# Patient Record
Sex: FEMALE | Race: OTHER | URBAN - METROPOLITAN AREA
[De-identification: names, ages, dates, MRNs, and addresses within clinical notes are randomized per-mention and may not be internally consistent; named-entity substitution may affect disease eponyms.]

---

## 2020-06-24 ENCOUNTER — EMERGENCY (EMERGENCY)
Facility: HOSPITAL | Age: 45
LOS: 0 days | Discharge: HOME | End: 2020-06-25
Attending: EMERGENCY MEDICINE
Payer: MEDICAID

## 2020-06-24 VITALS
TEMPERATURE: 99 F | SYSTOLIC BLOOD PRESSURE: 146 MMHG | OXYGEN SATURATION: 99 % | HEART RATE: 82 BPM | RESPIRATION RATE: 18 BRPM | DIASTOLIC BLOOD PRESSURE: 76 MMHG

## 2020-06-24 DIAGNOSIS — Z20.828 CONTACT WITH AND (SUSPECTED) EXPOSURE TO OTHER VIRAL COMMUNICABLE DISEASES: ICD-10-CM

## 2020-06-24 DIAGNOSIS — R07.89 OTHER CHEST PAIN: ICD-10-CM

## 2020-06-24 DIAGNOSIS — M54.6 PAIN IN THORACIC SPINE: ICD-10-CM

## 2020-06-24 DIAGNOSIS — E03.9 HYPOTHYROIDISM, UNSPECIFIED: ICD-10-CM

## 2020-06-24 DIAGNOSIS — M79.602 PAIN IN LEFT ARM: ICD-10-CM

## 2020-06-24 DIAGNOSIS — R06.02 SHORTNESS OF BREATH: ICD-10-CM

## 2020-06-24 LAB
ALBUMIN SERPL ELPH-MCNC: 4.7 G/DL — SIGNIFICANT CHANGE UP (ref 3.5–5.2)
ALP SERPL-CCNC: 69 U/L — SIGNIFICANT CHANGE UP (ref 30–115)
ALT FLD-CCNC: 23 U/L — SIGNIFICANT CHANGE UP (ref 0–41)
ANION GAP SERPL CALC-SCNC: 14 MMOL/L — SIGNIFICANT CHANGE UP (ref 7–14)
AST SERPL-CCNC: 27 U/L — SIGNIFICANT CHANGE UP (ref 0–41)
BASOPHILS # BLD AUTO: 0.04 K/UL — SIGNIFICANT CHANGE UP (ref 0–0.2)
BASOPHILS NFR BLD AUTO: 0.5 % — SIGNIFICANT CHANGE UP (ref 0–1)
BILIRUB SERPL-MCNC: 0.4 MG/DL — SIGNIFICANT CHANGE UP (ref 0.2–1.2)
BUN SERPL-MCNC: 11 MG/DL — SIGNIFICANT CHANGE UP (ref 10–20)
CALCIUM SERPL-MCNC: 9.8 MG/DL — SIGNIFICANT CHANGE UP (ref 8.5–10.1)
CHLORIDE SERPL-SCNC: 101 MMOL/L — SIGNIFICANT CHANGE UP (ref 98–110)
CO2 SERPL-SCNC: 25 MMOL/L — SIGNIFICANT CHANGE UP (ref 17–32)
CREAT SERPL-MCNC: 0.9 MG/DL — SIGNIFICANT CHANGE UP (ref 0.7–1.5)
EOSINOPHIL # BLD AUTO: 0.14 K/UL — SIGNIFICANT CHANGE UP (ref 0–0.7)
EOSINOPHIL NFR BLD AUTO: 1.6 % — SIGNIFICANT CHANGE UP (ref 0–8)
GLUCOSE SERPL-MCNC: 133 MG/DL — HIGH (ref 70–99)
HCG SERPL QL: NEGATIVE — SIGNIFICANT CHANGE UP
HCT VFR BLD CALC: 39.2 % — SIGNIFICANT CHANGE UP (ref 37–47)
HGB BLD-MCNC: 12.4 G/DL — SIGNIFICANT CHANGE UP (ref 12–16)
IMM GRANULOCYTES NFR BLD AUTO: 0.2 % — SIGNIFICANT CHANGE UP (ref 0.1–0.3)
LYMPHOCYTES # BLD AUTO: 2.95 K/UL — SIGNIFICANT CHANGE UP (ref 1.2–3.4)
LYMPHOCYTES # BLD AUTO: 33.6 % — SIGNIFICANT CHANGE UP (ref 20.5–51.1)
MCHC RBC-ENTMCNC: 24.9 PG — LOW (ref 27–31)
MCHC RBC-ENTMCNC: 31.6 G/DL — LOW (ref 32–37)
MCV RBC AUTO: 78.7 FL — LOW (ref 81–99)
MONOCYTES # BLD AUTO: 0.45 K/UL — SIGNIFICANT CHANGE UP (ref 0.1–0.6)
MONOCYTES NFR BLD AUTO: 5.1 % — SIGNIFICANT CHANGE UP (ref 1.7–9.3)
NEUTROPHILS # BLD AUTO: 5.19 K/UL — SIGNIFICANT CHANGE UP (ref 1.4–6.5)
NEUTROPHILS NFR BLD AUTO: 59 % — SIGNIFICANT CHANGE UP (ref 42.2–75.2)
NRBC # BLD: 0 /100 WBCS — SIGNIFICANT CHANGE UP (ref 0–0)
PLATELET # BLD AUTO: 287 K/UL — SIGNIFICANT CHANGE UP (ref 130–400)
POTASSIUM SERPL-MCNC: 4.3 MMOL/L — SIGNIFICANT CHANGE UP (ref 3.5–5)
POTASSIUM SERPL-SCNC: 4.3 MMOL/L — SIGNIFICANT CHANGE UP (ref 3.5–5)
PROT SERPL-MCNC: 8.1 G/DL — HIGH (ref 6–8)
RBC # BLD: 4.98 M/UL — SIGNIFICANT CHANGE UP (ref 4.2–5.4)
RBC # FLD: 15.6 % — HIGH (ref 11.5–14.5)
SODIUM SERPL-SCNC: 140 MMOL/L — SIGNIFICANT CHANGE UP (ref 135–146)
TROPONIN T SERPL-MCNC: <0.01 NG/ML — SIGNIFICANT CHANGE UP
WBC # BLD: 8.79 K/UL — SIGNIFICANT CHANGE UP (ref 4.8–10.8)
WBC # FLD AUTO: 8.79 K/UL — SIGNIFICANT CHANGE UP (ref 4.8–10.8)

## 2020-06-24 PROCEDURE — 93010 ELECTROCARDIOGRAM REPORT: CPT

## 2020-06-24 PROCEDURE — 99285 EMERGENCY DEPT VISIT HI MDM: CPT

## 2020-06-24 NOTE — ED PROVIDER NOTE - PHYSICAL EXAMINATION
CONSTITUTIONAL: Well-appearing; well-nourished; in no apparent distress.   NECK: Supple; non-tender; no cervical lymphadenopathy.   CARDIOVASCULAR: Normal S1, S2; no murmurs, rubs, or gallops. Equal radial pulses  CHEST: no chest wall ttp  RESPIRATORY: Normal chest excursion with respiration; breath sounds clear and equal bilaterally; no wheezes, rhonchi, or rales.  GI/: Normal bowel sounds; non-distended; non-tender; no palpable organomegaly.   MS: No evidence of trauma or deformity.  Normal ROM in all four extremities; non-tender to palpation; distal pulses are normal.   SKIN: Normal for age and race; warm; dry; good turgor; no apparent lesions or exudate.   Extrem: no calf ttp  NEURO/PSYCH: A & O x 4; grossly unremarkable. mood and manner are appropriate.

## 2020-06-24 NOTE — ED PROVIDER NOTE - CLINICAL SUMMARY MEDICAL DECISION MAKING FREE TEXT BOX
Pt presented with 2 weeks of CP and AKERS. Required labs, imaging, telemetric monitoring. Will be placed in EDOU for further workup.

## 2020-06-24 NOTE — ED PROVIDER NOTE - OBJECTIVE STATEMENT
45 year old F hx of hypothyroidism c/o L sided chest/arm pain x 15 days. Pain is sharp/intermittent and worse with exertion/relieved with rest. Pt saw cards in [place] and sts instructed to have CCTA but insurance would not approve it. Denies any assoc sob, fever/chills, cough, leg pain/swelling, hormone use, smoking hx, hx of DVT/PE.

## 2020-06-24 NOTE — ED PROVIDER NOTE - NS ED ROS FT
Constitutional: no fever, chills, no recent weight loss, change in appetite or malaise  Eyes: no redness/discharge/pain/vision changes  ENT: no rhinorrhea/ear pain/sore throat  Cardiac: + CP. No  SOB or edema.  Respiratory: No cough or respiratory distress  GI: No nausea, vomiting, diarrhea or abdominal pain.  : No dysuria, frequency, urgency or hematuria  MS: no pain to back or extremities, no loss of ROM, no weakness  Neuro: No headache or weakness. No LOC.  Skin: No skin rash.  Endocrine: + hx of hypothyroidism  Except as documented in the HPI, all other systems are negative.

## 2020-06-24 NOTE — ED PROVIDER NOTE - ATTENDING CONTRIBUTION TO CARE
I personally evaluated the patient. I reviewed the Resident’s or Physician Assistant’s note (as assigned above), and agree with the findings and plan except as documented in my note.  45 F, no pmhx, with complaints of 2 weeks of left sided chest pain with radiation to the left arm. Pain is sharp and moderately severe. Pain is aggravated by exertion. Associated with shortness of breath. Pt denies lightheadedness, no fever, coughing, CP, SOB. Pt states that she has b/l LE swelling when she stands for prolonged periods of time. VS reviewed, pt non-toxic appearing, NAD. Head ncat, MMM, neck supple, normal ROM, normal s1s2 without any murmurs, Lungs CTAB with normal work of breathing. abd +BS, s/nd/nt, extremities wnl without edema or cyanosis, neuro exam grossly normal. No acute skin rashes. Plan is EKG, labs, cardiac monitor, imaging and reassess.

## 2020-06-25 VITALS
DIASTOLIC BLOOD PRESSURE: 80 MMHG | OXYGEN SATURATION: 100 % | RESPIRATION RATE: 17 BRPM | HEART RATE: 81 BPM | SYSTOLIC BLOOD PRESSURE: 119 MMHG

## 2020-06-25 LAB — TROPONIN T SERPL-MCNC: <0.01 NG/ML — SIGNIFICANT CHANGE UP

## 2020-06-25 PROCEDURE — 75574 CT ANGIO HRT W/3D IMAGE: CPT | Mod: 26

## 2020-06-25 PROCEDURE — 99236 HOSP IP/OBS SAME DATE HI 85: CPT

## 2020-06-25 PROCEDURE — 71046 X-RAY EXAM CHEST 2 VIEWS: CPT | Mod: 26

## 2020-06-25 PROCEDURE — 93010 ELECTROCARDIOGRAM REPORT: CPT

## 2020-06-25 RX ORDER — METHOCARBAMOL 500 MG/1
1 TABLET, FILM COATED ORAL
Qty: 9 | Refills: 0
Start: 2020-06-25 | End: 2020-06-27

## 2020-06-25 RX ORDER — METOPROLOL TARTRATE 50 MG
50 TABLET ORAL ONCE
Refills: 0 | Status: COMPLETED | OUTPATIENT
Start: 2020-06-25 | End: 2020-06-25

## 2020-06-25 RX ORDER — KETOROLAC TROMETHAMINE 30 MG/ML
30 SYRINGE (ML) INJECTION ONCE
Refills: 0 | Status: DISCONTINUED | OUTPATIENT
Start: 2020-06-25 | End: 2020-06-25

## 2020-06-25 RX ORDER — METOPROLOL TARTRATE 50 MG
100 TABLET ORAL ONCE
Refills: 0 | Status: COMPLETED | OUTPATIENT
Start: 2020-06-25 | End: 2020-06-25

## 2020-06-25 RX ORDER — ACETAMINOPHEN 500 MG
650 TABLET ORAL ONCE
Refills: 0 | Status: COMPLETED | OUTPATIENT
Start: 2020-06-25 | End: 2020-06-25

## 2020-06-25 RX ADMIN — Medication 100 MILLIGRAM(S): at 09:34

## 2020-06-25 RX ADMIN — Medication 50 MILLIGRAM(S): at 06:45

## 2020-06-25 RX ADMIN — Medication 650 MILLIGRAM(S): at 09:34

## 2020-06-25 RX ADMIN — Medication 30 MILLIGRAM(S): at 00:31

## 2020-06-25 NOTE — ED CDU PROVIDER INITIAL DAY NOTE - PROGRESS NOTE DETAILS
pt seen bedside, NAD, pt still complaining of arm pain and chest pain overnight but not momentarily, asymptomatic. Negative cardiac enzymes x2 and nl ekg. pt scheduled to go for CCTA. Will continue to monitor patient. pt seen bedside, NAD, pt still complaining of arm pain and chest pain overnight but not momentarily, asymptomatic. Negative cardiac enzymes x2 and nl ekg. pt scheduled to go for CCTA. Will continue to monitor patient. pending ccta. trop neg.

## 2020-06-25 NOTE — ED CDU PROVIDER DISPOSITION NOTE - PATIENT PORTAL LINK FT
You can access the FollowMyHealth Patient Portal offered by [facility] by registering at the following website: [email or web address]. By joining [facility]’s FollowMyHealth portal, you will also be able to view your health information using other applications (apps) compatible with our system.

## 2020-06-25 NOTE — ED CDU PROVIDER INITIAL DAY NOTE - MEDICAL DECISION MAKING DETAILS
45F p/w left side cp/ shoulder pain. Vitals reviewed to be wnl. Physical-nad,perrl,mmm,rrr,ctab,abd softntnd,fromx4- left upper arm and shoulder ttp over bicep and shoulder muscles- pain with rom 5/5 strength and 2/4 reflexes, distal pulses presents  ,anox3

## 2020-06-25 NOTE — ED CDU PROVIDER INITIAL DAY NOTE - OBJECTIVE STATEMENT
44y/o female with pmh of hypothyroidism, pt. c/o left sided cp associated with left upper back pain, left arm pain and sob x2 weeks. denies fever, chills, cough, vomiting, abdominal pain, trauma. pt. was seen by her cardiologist in [place] where she had blood work done and planned on having ct of chest done as well but due to insurance issue the test could not be arranged. pt. is not a smoker. + fhx of angina in both parents.

## 2020-06-25 NOTE — ED CDU PROVIDER DISPOSITION NOTE - CLINICAL COURSE
pt seen bedside, NAD, pt still complaining of arm pain and chest pain overnight but not momentarily, asymptomatic. Negative cardiac enzymes x2 and nl ekg. pt scheduled to go for CCTA. Will continue to monitor patient. pending ccta. trop neg. pt seen bedside, NAD, pt still complaining of arm pain and chest pain overnight but not momentarily, asymptomatic. Negative cardiac enzymes x2 and nl ekg. pt scheduled to go for CCTA. Will continue to monitor patient. trop neg x 2. ccta- rads 0. likely msk related pain. dc home.

## 2020-06-25 NOTE — ED CDU PROVIDER INITIAL DAY NOTE - FAMILY HISTORY
Father  Still living? Unknown  Family history of angina, Age at diagnosis: Age Unknown     Mother  Still living? Unknown  Family history of angina, Age at diagnosis: Age Unknown

## 2020-06-25 NOTE — ED CDU PROVIDER DISPOSITION NOTE - CARE PROVIDER_API CALL
Wyatt Chong Y  CARDIOVASCULAR DISEASE  [address] [place] [id number]  Phone: [phone]  Fax: [phone]  Follow Up Time: Routine

## 2020-06-25 NOTE — ED ADULT NURSE REASSESSMENT NOTE - NS ED NURSE REASSESS COMMENT FT1
Assumed care of patient at this time. Patient transferred from another area of ED. Patient sitting on stretcher AAOx4 in no acute distress. Respirations easy and unlabored. She presents complaining of chest pain x2 weeks that has been getting progressively worse. She reports shortness of breath associated with chest pain. Denies cough, sore throat, fever, or chills. Continuous cardiac monitor, blood pressure, and oxygen saturation monitoring applied. Vital signs stable. IV established. Blood specimens drawn and sent to lab, results now pending. Chest X-ray pending. Patient denies need for assistance, call bell in reach, will continue to monitor patient.
Endorsed patient to Cindy PARKER to continue care of patient.
Patient has been placed in observation status for repeat troponin and stress test or CCTA in the morning. Patient sitting on stretcher AAOx4 in no acute distress. Respirations easy and unlabored. Assessment unchanged. Continuous cardiac monitor, blood pressure, and oxygen saturation monitoring remains applied. Patient denies need for assistance, call bell in reach, will continue to monitor patient.
Patient resting comfortably on stretcher in no acute distress. Respirations easy and unlabored. She offers no complaints at this time. Denies chest pain. Assessment unchanged. Awaiting CCTA at this time. Continuous cardiac monitor, blood pressure, and oxygen saturation monitoring remains applied. Vital signs stable. Patient denies need for assistance, call bell in reach, will continue to monitor patient.
Repeat troponin drawn and sent to lab, results now pending. Patient sitting on stretcher AAOx4 in no acute distress. Respirations easy and unlabored. She reports decrease in chest pain after receiving pain medication. She appears comfortable at this time. Continuous cardiac monitor, blood pressure, and oxygen saturation monitoring remains applied. Vital signs stable. Patient denies need for assistance, call bell in reach, will continue to monitor patient.
Patient is AAOx4, No distress noted at this time, call bell within reach of patient, will continue to monitor patient.

## 2020-12-14 NOTE — ED CDU PROVIDER INITIAL DAY NOTE - INDICATION FOR OBSERVATION
Deshaun Pearson   [address] [facility] [address]       12/14/20    Dear Deshaun  You had a colonoscopy performed at [facility] on December 1, 2020. Benign adenomatous and hyperplastic colon polyps were removed.    A repeat colonoscopy exam is recommended in 5 years.     Please keep in mind that many colon polyps are precancerous and should be removed to prevent a cancerous growth in the future. Please contact us or your primary care physician when it is time for your repeat colonoscopy, or sooner, if you are experiencing gastrointestinal problems.       If you have any questions or concerns, please contact the clinic at [phone] for an appointment.    Thank you,  Devora Anthony MD   Diagnostic Uncertainty

## 2021-09-01 NOTE — ED ADULT NURSE REASSESSMENT NOTE - NSFALLRSKUNASSIST_ED_ALL_ED
Spoke with TM.  Non-vaccinated TM. States had new sx of diarrhea and nasal congestion last night.  Advised to retest today and may RTW if test is negative and sx are resolving.  States diarrhea has stopped.  Reviewed testing procedure and advised to swab both nostrils.  Instructions resent.  Advised to still do final test on 9/2 to 9/4. Discussed sx tracker.    no

## 2022-07-29 PROBLEM — Z00.00 ENCOUNTER FOR PREVENTIVE HEALTH EXAMINATION: Status: ACTIVE | Noted: 2022-07-29

## 2022-10-13 NOTE — ED ADULT TRIAGE NOTE - PATIENT/CAREGIVER OFFERED  INTERPRETER SERVICES
yes For information on Fall & Injury Prevention, visit: [email or web address] OR  [email or web address] OR  https://www.cdc.gov/steadi/patient.html
